# Patient Record
(demographics unavailable — no encounter records)

---

## 2024-12-11 NOTE — PHYSICAL EXAM
[FreeTextEntry1] : Perianal inspection revealed punctate erythema consistent with prior radiation.  Digital exam and anoscopy unremarkable.  Anoscopy performed to evaluate anal canal.  No sedation required.

## 2024-12-11 NOTE — HISTORY OF PRESENT ILLNESS
[FreeTextEntry1] : QUOC MACIAS is a 67 year old male being seen for a follow-up visit, fissure  Disease: anal cancer (right posterior) Pathology: squamous cell TNM stage: T1, N0, M0 AJCC Stage: 1  s/p RBL Left lateral on 05/27/21  s/p transanal excision of right posterior anorectal lesion 6/15/21. Pathology; anorectal lesion biopsy- invasive moderately differentiated non- keratinizing squamous cell carcinoma. Addendum- in situ hybridization was performed; there is focal positivity for high risk HPV; low risk HPV is negative.  CT abdomen pelvis chest 6/24/21- no gross evidence for metastatic disease. limited evaluation of the rectum on CT scan.  Completed Xeloda/[facility]/RT on 8/26/2021, 28 fractions were completed  MR pelvis rectal anal 10/22/21- no mass seen in the anal canal. no evidence of metastatic disease in the pelvis.  CT A/P 08/12/22 (abdominal pain h/o anal cancer) - Unremarkable abdominal pelvic CT. Etiology for the patient's pain not identified.  Colonoscopy on 11/2/22 (f/u analo canal cancer, f/u of terminal ileal ulcers) - The examined portion of the ileum was normal. One 3mm polyp in the distal ascending colon, removed with a cold biopsy forceps. Resected and retrieved. Diverticulosis in the sigmoid colon. The examination was otherwise normal. External and internal hemorrhoids. Pathology - Colon, distal ascending, polyp, polypectomy: tubular adenoma.  Last seen 6/12/24 - Patient doing well. No evidence of recurrence. Follow-up with me 6 months. Follow-up colonoscopy with GI as per GI recommendation. Patient encouraged to follow-up with cardiology regarding anticoagulation and risk of stroke off of anticoagulation.   Today pt reports no pain. Daily BMs, formed, take psyllium ever 1-3 days, denies pain, rare bleeding with hard BMs, occasional leakage of mucus/stool, and denies feeling swollen or prolapsed tissue. Takes baby aspirin.

## 2025-06-11 NOTE — HISTORY OF PRESENT ILLNESS
[FreeTextEntry1] : Norbert is a 68 year old male being seen for a follow-up visit.   Disease: anal cancer (right posterior) Pathology: squamous cell TNM stage: T1, N0, M0 AJCC Stage: 1  s/p RBL Left lateral on 05/27/21  s/p transanal excision of right posterior anorectal lesion 6/15/21. Pathology; anorectal lesion biopsy- invasive moderately differentiated non- keratinizing squamous cell carcinoma. Addendum- in situ hybridization was performed; there is focal positivity for high risk HPV; low risk HPV is negative.  CT abdomen pelvis chest 6/24/21- no gross evidence for metastatic disease. limited evaluation of the rectum on CT scan.  Completed Xeloda/[facility]MC/RT on 8/26/2021, 28 fractions were completed  MR pelvis rectal anal 10/22/21- no mass seen in the anal canal. no evidence of metastatic disease in the pelvis.  CT A/P 08/12/22 (abdominal pain h/o anal cancer) - Unremarkable abdominal pelvic CT. Etiology for the patient's pain not identified.  Colonoscopy on 11/2/22 (f/u analo canal cancer, f/u of terminal ileal ulcers) - The examined portion of the ileum was normal. One 3mm polyp in the distal ascending colon, removed with a cold biopsy forceps. Resected and retrieved. Diverticulosis in the sigmoid colon. The examination was otherwise normal. External and internal hemorrhoids. Pathology - Colon, distal ascending, polyp, polypectomy: tubular adenoma.  Last seen on 12/11/24 - No evidence of recurrence. Follow-up 6 months.  Today pt reports no pain. Daily 6-7 times BMs, formed, has urgency to have BM - has had accidents of whole BMs (occurs about once a month), denies pain, no bleeding, rare mucus leakage, no episodes of incontinence, and denies feeling swollen or prolapsed tissue.

## 2025-06-11 NOTE — PHYSICAL EXAM
[FreeTextEntry1] : Perianal inspection digital exam and anoscopy unremarkable no evidence of recurrence.  Anoscopy performed to evaluate anal canal.  No sedation required.